# Patient Record
Sex: FEMALE | Race: WHITE | Employment: OTHER | URBAN - METROPOLITAN AREA
[De-identification: names, ages, dates, MRNs, and addresses within clinical notes are randomized per-mention and may not be internally consistent; named-entity substitution may affect disease eponyms.]

---

## 2019-01-03 ENCOUNTER — HOSPITAL ENCOUNTER (OUTPATIENT)
Age: 69
Setting detail: SPECIMEN
Discharge: HOME | End: 2019-01-03
Payer: COMMERCIAL

## 2019-01-04 LAB
DIRECT EXAM: NORMAL
Lab: NORMAL
SPECIMEN DESCRIPTION: NORMAL
STATUS: NORMAL

## 2020-01-13 ENCOUNTER — HOSPITAL ENCOUNTER (OUTPATIENT)
Age: 70
Setting detail: SPECIMEN
Discharge: HOME | End: 2020-01-13
Payer: MEDICARE

## 2020-01-15 LAB
CULTURE: ABNORMAL
Lab: ABNORMAL
SPECIMEN DESCRIPTION: ABNORMAL

## 2020-05-27 ENCOUNTER — HOSPITAL ENCOUNTER (OUTPATIENT)
Dept: GENERAL RADIOLOGY | Age: 70
Discharge: HOME | End: 2020-05-27
Payer: MEDICARE

## 2020-05-27 ENCOUNTER — HOSPITAL ENCOUNTER (OUTPATIENT)
Age: 70
Discharge: HOME | End: 2020-05-27
Payer: MEDICARE

## 2020-05-27 PROCEDURE — 71046 X-RAY EXAM CHEST 2 VIEWS: CPT

## 2020-08-05 ENCOUNTER — HOSPITAL ENCOUNTER (OUTPATIENT)
Age: 70
Setting detail: SPECIMEN
Discharge: HOME | End: 2020-08-05
Payer: MEDICARE

## 2020-08-07 LAB
CULTURE: ABNORMAL
Lab: ABNORMAL
SPECIMEN DESCRIPTION: ABNORMAL

## 2020-08-25 ENCOUNTER — HOSPITAL ENCOUNTER (OUTPATIENT)
Age: 70
Discharge: HOME | End: 2020-08-25
Payer: MEDICARE

## 2020-08-25 ENCOUNTER — HOSPITAL ENCOUNTER (OUTPATIENT)
Dept: GENERAL RADIOLOGY | Age: 70
Discharge: HOME | End: 2020-08-25
Payer: MEDICARE

## 2020-08-25 PROCEDURE — 72120 X-RAY BEND ONLY L-S SPINE: CPT

## 2020-08-25 PROCEDURE — 72072 X-RAY EXAM THORAC SPINE 3VWS: CPT

## 2020-08-26 ENCOUNTER — HOSPITAL ENCOUNTER (OUTPATIENT)
Dept: ULTRASOUND IMAGING | Age: 70
Discharge: HOME | End: 2020-08-26
Payer: MEDICARE

## 2020-11-04 ENCOUNTER — HOSPITAL ENCOUNTER (OUTPATIENT)
Dept: GENERAL RADIOLOGY | Age: 70
Discharge: HOME | End: 2020-11-04
Payer: MEDICARE

## 2020-11-04 ENCOUNTER — HOSPITAL ENCOUNTER (OUTPATIENT)
Age: 70
Discharge: HOME | End: 2020-11-04
Payer: MEDICARE

## 2020-11-04 PROCEDURE — 73502 X-RAY EXAM HIP UNI 2-3 VIEWS: CPT

## 2020-12-02 ENCOUNTER — HOSPITAL ENCOUNTER (OUTPATIENT)
Age: 70
Discharge: HOME | End: 2020-12-02
Payer: MEDICARE

## 2020-12-02 LAB
T4 FREE: 0.81 NG/DL (ref 0.93–1.76)
TSH SERPL DL<=0.05 MIU/L-ACNC: 3.65 UIU/ML (ref 0.4–4.2)

## 2020-12-02 PROCEDURE — 84443 ASSAY THYROID STIM HORMONE: CPT

## 2020-12-02 PROCEDURE — 84439 ASSAY OF FREE THYROXINE: CPT

## 2020-12-02 PROCEDURE — 36415 COLL VENOUS BLD VENIPUNCTURE: CPT

## 2020-12-04 ENCOUNTER — HOSPITAL ENCOUNTER (OUTPATIENT)
Age: 70
Setting detail: SPECIMEN
Discharge: HOME | End: 2020-12-04
Payer: MEDICARE

## 2020-12-04 PROCEDURE — U0003 INFECTIOUS AGENT DETECTION BY NUCLEIC ACID (DNA OR RNA); SEVERE ACUTE RESPIRATORY SYNDROME CORONAVIRUS 2 (SARS-COV-2) (CORONAVIRUS DISEASE [COVID-19]), AMPLIFIED PROBE TECHNIQUE, MAKING USE OF HIGH THROUGHPUT TECHNOLOGIES AS DESCRIBED BY CMS-2020-01-R: HCPCS

## 2020-12-06 LAB — SARS-COV-2: NOT DETECTED

## 2020-12-15 ENCOUNTER — APPOINTMENT (OUTPATIENT)
Dept: CT IMAGING | Age: 70
End: 2020-12-15
Payer: MEDICARE

## 2020-12-15 ENCOUNTER — HOSPITAL ENCOUNTER (EMERGENCY)
Age: 70
Discharge: HOME | End: 2020-12-15
Attending: EMERGENCY MEDICINE
Payer: MEDICARE

## 2020-12-15 VITALS
BODY MASS INDEX: 27.28 KG/M2 | OXYGEN SATURATION: 99 % | WEIGHT: 154 LBS | RESPIRATION RATE: 15 BRPM | DIASTOLIC BLOOD PRESSURE: 79 MMHG | SYSTOLIC BLOOD PRESSURE: 114 MMHG | TEMPERATURE: 98.2 F | HEART RATE: 85 BPM

## 2020-12-15 LAB
ALBUMIN SERPL-MCNC: 4.1 G/DL (ref 3.5–5.1)
ALP BLD-CCNC: 90 U/L (ref 38–126)
ALT SERPL-CCNC: 57 U/L (ref 11–66)
ANION GAP SERPL CALCULATED.3IONS-SCNC: 9 MEQ/L (ref 8–16)
AST SERPL-CCNC: 65 U/L (ref 5–40)
BASOPHILS # BLD: 0.4 %
BASOPHILS ABSOLUTE: 0 THOU/MM3 (ref 0–0.1)
BILIRUB SERPL-MCNC: 0.3 MG/DL (ref 0.3–1.2)
BUN BLDV-MCNC: 10 MG/DL (ref 7–22)
CALCIUM SERPL-MCNC: 9.4 MG/DL (ref 8.5–10.5)
CHLORIDE BLD-SCNC: 108 MEQ/L (ref 98–111)
CO2: 27 MEQ/L (ref 23–33)
CREAT SERPL-MCNC: 0.5 MG/DL (ref 0.4–1.2)
EOSINOPHIL # BLD: 1.1 %
EOSINOPHILS ABSOLUTE: 0.1 THOU/MM3 (ref 0–0.4)
ERYTHROCYTE [DISTWIDTH] IN BLOOD BY AUTOMATED COUNT: 13 % (ref 11.5–14.5)
ERYTHROCYTE [DISTWIDTH] IN BLOOD BY AUTOMATED COUNT: 45 FL (ref 35–45)
GFR SERPL CREATININE-BSD FRML MDRD: > 90 ML/MIN/1.73M2
GLUCOSE BLD-MCNC: 101 MG/DL (ref 70–108)
HCT VFR BLD CALC: 38.1 % (ref 37–47)
HEMOGLOBIN: 12.2 GM/DL (ref 12–16)
IMMATURE GRANS (ABS): 0.01 THOU/MM3 (ref 0–0.07)
IMMATURE GRANULOCYTES: 0.2 %
LIPASE: 16.6 U/L (ref 5.6–51.3)
LYMPHOCYTES # BLD: 29.4 %
LYMPHOCYTES ABSOLUTE: 1.6 THOU/MM3 (ref 1–4.8)
MCH RBC QN AUTO: 30.2 PG (ref 26–33)
MCHC RBC AUTO-ENTMCNC: 32 GM/DL (ref 32.2–35.5)
MCV RBC AUTO: 94.3 FL (ref 81–99)
MONOCYTES # BLD: 9.3 %
MONOCYTES ABSOLUTE: 0.5 THOU/MM3 (ref 0.4–1.3)
NUCLEATED RED BLOOD CELLS: 0 /100 WBC
OSMOLALITY CALCULATION: 286 MOSMOL/KG (ref 275–300)
PLATELET # BLD: 265 THOU/MM3 (ref 130–400)
PMV BLD AUTO: 10.6 FL (ref 9.4–12.4)
POTASSIUM REFLEX MAGNESIUM: 4.3 MEQ/L (ref 3.5–5.2)
RBC # BLD: 4.04 MILL/MM3 (ref 4.2–5.4)
SEG NEUTROPHILS: 59.6 %
SEGMENTED NEUTROPHILS ABSOLUTE COUNT: 3.3 THOU/MM3 (ref 1.8–7.7)
SODIUM BLD-SCNC: 144 MEQ/L (ref 135–145)
TOTAL PROTEIN: 7.2 G/DL (ref 6.1–8)
WBC # BLD: 5.5 THOU/MM3 (ref 4.8–10.8)

## 2020-12-15 PROCEDURE — 74177 CT ABD & PELVIS W/CONTRAST: CPT

## 2020-12-15 PROCEDURE — 83690 ASSAY OF LIPASE: CPT

## 2020-12-15 PROCEDURE — 80053 COMPREHEN METABOLIC PANEL: CPT

## 2020-12-15 PROCEDURE — 36415 COLL VENOUS BLD VENIPUNCTURE: CPT

## 2020-12-15 PROCEDURE — 74176 CT ABD & PELVIS W/O CONTRAST: CPT

## 2020-12-15 PROCEDURE — 85025 COMPLETE CBC W/AUTO DIFF WBC: CPT

## 2020-12-15 PROCEDURE — 99285 EMERGENCY DEPT VISIT HI MDM: CPT

## 2020-12-15 PROCEDURE — 6360000004 HC RX CONTRAST MEDICATION: Performed by: EMERGENCY MEDICINE

## 2020-12-15 RX ORDER — ONDANSETRON 4 MG/1
4 TABLET, ORALLY DISINTEGRATING ORAL EVERY 8 HOURS PRN
Qty: 20 TABLET | Refills: 0 | Status: SHIPPED | OUTPATIENT
Start: 2020-12-15

## 2020-12-15 RX ORDER — HYDROCODONE BITARTRATE AND ACETAMINOPHEN 5; 325 MG/1; MG/1
1 TABLET ORAL 2 TIMES DAILY PRN
Qty: 6 TABLET | Refills: 0 | Status: SHIPPED | OUTPATIENT
Start: 2020-12-15 | End: 2020-12-18

## 2020-12-15 RX ADMIN — IOPAMIDOL 80 ML: 755 INJECTION, SOLUTION INTRAVENOUS at 15:42

## 2020-12-15 ASSESSMENT — PAIN DESCRIPTION - ORIENTATION: ORIENTATION: LEFT;UPPER

## 2020-12-15 ASSESSMENT — PAIN SCALES - GENERAL: PAINLEVEL_OUTOF10: 3

## 2020-12-15 ASSESSMENT — PAIN DESCRIPTION - FREQUENCY: FREQUENCY: CONTINUOUS

## 2020-12-15 ASSESSMENT — PAIN DESCRIPTION - PAIN TYPE: TYPE: ACUTE PAIN

## 2020-12-15 ASSESSMENT — PAIN DESCRIPTION - LOCATION: LOCATION: ABDOMEN

## 2020-12-15 NOTE — ED NOTES
ED nurse-to-nurse bedside report    No chief complaint on file. LOC: alert and orientated to name, place, date  Vital signs   Vitals:    12/15/20 1143 12/15/20 1310   BP: 127/65 114/79   Pulse: 63 62   Resp: 15 15   Temp: 98.2 °F (36.8 °C)    TempSrc: Oral    SpO2: 99% 99%   Weight: 154 lb (69.9 kg)       Pain:    Pain Interventions: none  Pain Goal: none  Oxygen: No    Current needs required none   Telemetry: No  LDAs:    Continuous Infusions:   Mobility: Independent  Panchal Fall Risk Score: No flowsheet data found.   Fall Interventions: side rails  Report given to: REYNA Cheek RN  12/15/20 1310

## 2020-12-15 NOTE — ED NOTES
Pt resting in bed with side rails up 2x2 and call light in reach. Vital signs assessed and stable. Pt updated on plan of care. Pt voiced understanding. Pt verbalized no other needs or concerns at this time.         Uzma Soliz RN  12/15/20 0680

## 2020-12-15 NOTE — ED NOTES
Pt resting in bed with side rails up 2x2 and call light in reach. Vital signs assessed and stable. IV site checked. Pt updated on plan of care. Pt voiced understanding. Pt verbalized no other needs or concerns at this time.         Christy Ellison RN  12/15/20 1199

## 2020-12-15 NOTE — ED PROVIDER NOTES
[facility] EMERGENCY DEPT      CHIEF COMPLAINT       Chief Complaint   Patient presents with    Nausea    Flank Pain       Nurses Notes reviewed and I agree except as noted in the HPI. HISTORY OF PRESENT ILLNESS    Lilliam Mcdaniel is a 78 y.o. female who presents with complaint of left flank pain, patient is status post EGD and colonoscopy done by Dr. Juni Gutierrez 3 days ago. Patient states that she had worsening pain until yesterday which has gradually gotten better. She denies any fever chills, has been passing gas and having bowel movements. She has had no nausea or vomiting. Onset: Acute  Duration: Around 48 hours  Timing: Improving  Location of Pain: Left mid to upper quadrant region  Intesity/severity: Mild currently  Modifying Factors: Palpation  Relieved by;  Previous Episodes; Tx Before arrival: Ibuprofen  REVIEW OF SYSTEMS      Review of Systems   Constitutional: Negative for fever, chills, diaphoresis and fatigue. HENT: Negative for congestion, drooling, facial swelling and sore throat. Eyes: Negative for photophobia, pain and discharge. Respiratory: Negative for cough, shortness of breath, wheezing and stridor. Cardiovascular: Negative for chest pain, palpitations and leg swelling. Gastrointestinal: Negative for abdominal pain, blood in stool and abdominal distention. Positive for pain left mid to upper quadrant region  Genitourinary: Negative for dysuria, urgency, hematuria and difficulty urinating. Musculoskeletal: Negative for gait problem, neck pain and neck stiffness. Skin; No rash, No itching  Neurological: Negative for seizures, weakness and numbness. Psychiatric/Behavioral: Negative for hallucinations, confusion and agitation. PAST MEDICAL HISTORY    has a past medical history of Arrhythmia, Cancer ([address] Panic disorder, Restless leg syndrome, and Thyroid disease. SURGICAL HISTORY      has a past surgical history that includes Breast lumpectomy (1974);  Hysterectomy (); Cholecystectomy (); and fracture surgery (). CURRENT MEDICATIONS       Discharge Medication List as of 12/15/2020  6:15 PM      CONTINUE these medications which have NOT CHANGED    Details   omeprazole (PRILOSEC) 20 MG delayed release capsule Take 20 mg by mouth dailyHistorical Med      aspirin 81 MG EC tablet Take 81 mg by mouth dailyHistorical Med      ZINC PO Take 130 mg by mouth dailyHistorical Med      vitamin C (ASCORBIC ACID) 500 MG tablet Take 500 mg by mouth dailyHistorical Med      TURMERIC PO Take 180 mg by mouth dailyHistorical Med      VITAMIN E PO Take by mouth dailyHistorical Med      MAGNESIUM PO Take 250 mg by mouth 2 times daily Indications: 2 tabs bid Historical Med      metoprolol (LOPRESSOR) 25 MG tablet Take 0.5 tablets by mouth 2 times daily. , Disp-90 tablet,R-3Normal             ALLERGIES     is allergic to erythromycin and morphine. FAMILY HISTORY     She indicated that her mother is . She indicated that her father is . She indicated that her sister is alive. She indicated that both of her brothers are . She indicated that her maternal grandmother is . She indicated that her maternal grandfather is . She indicated that her paternal grandmother is . She indicated that her paternal grandfather is . She indicated that her son is alive. family history includes Cancer in her brother; Diabetes in her brother, mother, paternal grandfather, and sister; Heart Disease in her mother and son; Heart Failure in her father; Other in her brother and mother; Tuberculosis in her maternal grandmother. SOCIAL HISTORY      reports that she has never smoked. She has never used smokeless tobacco. She reports that she does not drink alcohol or use drugs. PHYSICAL EXAM     INITIAL VITALS:  weight is 154 lb (69.9 kg). Her oral temperature is 98.2 °F (36.8 °C). Her blood pressure is 114/79 and her pulse is 85.  Her respiration is 15 and oxygen saturation is 99%. Physical Exam   Constitutional:  well-developed and well-nourished. HENT: Head: Normocephalic, atraumatic, Bilateral external ears normal, Oropharynx mosit, No oral exudates, Nose normal.   Eyes: PERRL, EOMI, Conjunctiva normal, No discharge. No scleral icterus  Neck: Normal range of motion, No tenderness, Supple  Cardiovascular: Normal rate, regular rhythm, S1 normal and S2 normal.  Exam reveals no gallop. Pulmonary/Chest: Effort normal and breath sounds normal. No accessory muscle usage or stridor. No respiratory distress. no wheezes. has no rales. exhibits no tenderness. Abdominal: Soft. Bowel sounds are normal.  exhibits no distension. There is left mid to upper quadrant tenderness. There is no rebound and no guarding. Extremities: No edema, no tenderness, no cyanosis, no clubbing. Musculoskeletal: Good range of motion in major joints is observed. No major deformities noted. Neurological: Alert and oriented ×3, normal motor function, normal sensory function, no focal deficits. GCS 15  Skin: Skin is warm, dry and intact. No rash noted. No erythema. Psychiatric: Affect normal, judgment normal, mood normal.  DIFFERENTIAL DIAGNOSIS:   Colonic rupture, diverticulitis, splenic injury    DIAGNOSTIC RESULTS     EKG: All EKG's are interpreted by the Emergency Department Physician who either signs or Co-signs this chart in the absence of a cardiologist.      RADIOLOGY: non-plain film images(s) such as CT, Ultrasound and MRI are read by the radiologist.  Plain radiographic images are visualized and preliminarily interpreted by the emergency physician unless otherwise stated below.       LABS:   Labs Reviewed   CBC WITH AUTO DIFFERENTIAL - Abnormal; Notable for the following components:       Result Value    RBC 4.04 (*)     MCHC 32.0 (*)     All other components within normal limits   COMPREHENSIVE METABOLIC PANEL W/ REFLEX TO MG FOR LOW K - Abnormal; Notable for the following components:    AST 65 (*)     All other components within normal limits   LIPASE   ANION GAP   GLOMERULAR FILTRATION RATE, ESTIMATED   OSMOLALITY   URINE RT REFLEX TO CULTURE       EMERGENCY DEPARTMENT COURSE:   Vitals:    Vitals:    12/15/20 1310 12/15/20 1333 12/15/20 1508 12/15/20 1632   BP: 114/79      Pulse: 62 68 86 85   Resp: 15      Temp:       TempSrc:       SpO2: 99% 100% 98% 99%   Weight:         Patient presenting with complaint of left abdominal pain status post colonoscopy. CAT scan of the abdomen shows possible splenic laceration/hematoma formation. Patient is hemodynamically stable. Patient has no active bleeding per repeat CT abdomen with contrast.  She has had hematoma formation for over 3 days, case discussed with general surgery, Dr. Trace Davidson, thinks that patient can be discharged home with GI follow-up and repeat hemoglobin. Patient given return precautions, has been in room both admit understanding. CRITICAL CARE:       CONSULTS:  None    PROCEDURES:  None    FINAL IMPRESSION      1. Spleen hematoma, initial encounter          DISPOSITION/PLAN   Decision To Discharge    PATIENT REFERRED TO:  Sarah Rehman MD  [address] [place] [address] [address]  [phone]    Call in 1 day  RE-CHECK AND FURTHER TESTING AS NEEDED      DISCHARGE MEDICATIONS:  Discharge Medication List as of 12/15/2020  6:15 PM      START taking these medications    Details   HYDROcodone-acetaminophen (NORCO) 5-325 MG per tablet Take 1 tablet by mouth 2 times daily as needed for Pain for up to 3 days. , Disp-6 tablet, R-0Print      ondansetron (ZOFRAN ODT) 4 MG disintegrating tablet Take 1 tablet by mouth every 8 hours as needed for Nausea, Disp-20 tablet, R-0Print             (Please note that portions of this note were completed with a voice recognition program.  Efforts were made to edit the dictations but occasionally words are mis-transcribed.)    Dane Marrero, [address] [place] [address],   12/16/20 5608

## 2020-12-17 ENCOUNTER — HOSPITAL ENCOUNTER (OUTPATIENT)
Age: 70
Discharge: HOME | End: 2020-12-17
Payer: MEDICARE

## 2020-12-17 LAB
ERYTHROCYTE [DISTWIDTH] IN BLOOD BY AUTOMATED COUNT: 13.2 % (ref 11.5–14.5)
ERYTHROCYTE [DISTWIDTH] IN BLOOD BY AUTOMATED COUNT: 45.6 FL (ref 35–45)
HCT VFR BLD CALC: 38.2 % (ref 37–47)
HEMOGLOBIN: 12.4 GM/DL (ref 12–16)
MCH RBC QN AUTO: 30.8 PG (ref 26–33)
MCHC RBC AUTO-ENTMCNC: 32.5 GM/DL (ref 32.2–35.5)
MCV RBC AUTO: 94.8 FL (ref 81–99)
PLATELET # BLD: 291 THOU/MM3 (ref 130–400)
PMV BLD AUTO: 10.9 FL (ref 9.4–12.4)
RBC # BLD: 4.03 MILL/MM3 (ref 4.2–5.4)
WBC # BLD: 6.1 THOU/MM3 (ref 4.8–10.8)

## 2020-12-17 PROCEDURE — 36415 COLL VENOUS BLD VENIPUNCTURE: CPT

## 2020-12-17 PROCEDURE — 85027 COMPLETE CBC AUTOMATED: CPT

## 2021-01-07 ENCOUNTER — HOSPITAL ENCOUNTER (OUTPATIENT)
Age: 71
Discharge: HOME | End: 2021-01-07
Payer: MEDICARE

## 2021-01-07 LAB
T3 TOTAL: 120 NG/DL (ref 72–181)
TSH SERPL DL<=0.05 MIU/L-ACNC: 4.26 UIU/ML (ref 0.4–4.2)

## 2021-01-07 PROCEDURE — 36415 COLL VENOUS BLD VENIPUNCTURE: CPT

## 2021-01-07 PROCEDURE — 84480 ASSAY TRIIODOTHYRONINE (T3): CPT

## 2021-01-07 PROCEDURE — 84443 ASSAY THYROID STIM HORMONE: CPT

## 2021-01-07 PROCEDURE — 84442 ASSAY OF THYROID ACTIVITY: CPT

## 2021-01-07 PROCEDURE — 84436 ASSAY OF TOTAL THYROXINE: CPT

## 2021-01-08 LAB — THYROXINE (T4): 5.1 UG/DL (ref 4.5–10.9)

## 2021-01-10 LAB — THYROXINE BINDING GLOBULIN: 18.5 UG/ML (ref 13–30)

## 2021-02-08 ENCOUNTER — HOSPITAL ENCOUNTER (OUTPATIENT)
Dept: CT IMAGING | Age: 71
Discharge: HOME | End: 2021-02-08
Payer: MEDICARE

## 2021-02-08 DIAGNOSIS — S36.029D: ICD-10-CM

## 2021-02-08 PROCEDURE — 74150 CT ABDOMEN W/O CONTRAST: CPT

## 2021-04-08 ENCOUNTER — HOSPITAL ENCOUNTER (OUTPATIENT)
Age: 71
Discharge: HOME | End: 2021-04-08
Payer: MEDICARE

## 2021-04-08 LAB — SARS-COV-2 ANTIBODY, TOTAL: POSITIVE

## 2021-04-08 PROCEDURE — 86769 SARS-COV-2 COVID-19 ANTIBODY: CPT

## 2021-04-08 PROCEDURE — 36415 COLL VENOUS BLD VENIPUNCTURE: CPT

## 2021-04-12 ENCOUNTER — HOSPITAL ENCOUNTER (OUTPATIENT)
Age: 71
Discharge: HOME | End: 2021-04-12
Payer: MEDICARE

## 2021-04-12 LAB
ALBUMIN SERPL-MCNC: 4.3 G/DL (ref 3.5–5.1)
ALP BLD-CCNC: 91 U/L (ref 38–126)
ALT SERPL-CCNC: 17 U/L (ref 11–66)
ANION GAP SERPL CALCULATED.3IONS-SCNC: 10 MEQ/L (ref 8–16)
AST SERPL-CCNC: 23 U/L (ref 5–40)
BILIRUB SERPL-MCNC: 0.2 MG/DL (ref 0.3–1.2)
BUN BLDV-MCNC: 16 MG/DL (ref 7–22)
CALCIUM SERPL-MCNC: 8.9 MG/DL (ref 8.5–10.5)
CHLORIDE BLD-SCNC: 108 MEQ/L (ref 98–111)
CHOLESTEROL, TOTAL: 196 MG/DL (ref 100–199)
CO2: 25 MEQ/L (ref 23–33)
CREAT SERPL-MCNC: 0.6 MG/DL (ref 0.4–1.2)
ERYTHROCYTE [DISTWIDTH] IN BLOOD BY AUTOMATED COUNT: 13.4 % (ref 11.5–14.5)
ERYTHROCYTE [DISTWIDTH] IN BLOOD BY AUTOMATED COUNT: 46.1 FL (ref 35–45)
GFR SERPL CREATININE-BSD FRML MDRD: > 90 ML/MIN/1.73M2
GLUCOSE BLD-MCNC: 90 MG/DL (ref 70–108)
HCT VFR BLD CALC: 40.2 % (ref 37–47)
HDLC SERPL-MCNC: 51 MG/DL
HEMOGLOBIN: 12.5 GM/DL (ref 12–16)
LDL CHOLESTEROL CALCULATED: 129 MG/DL
MCH RBC QN AUTO: 29 PG (ref 26–33)
MCHC RBC AUTO-ENTMCNC: 31.1 GM/DL (ref 32.2–35.5)
MCV RBC AUTO: 93.3 FL (ref 81–99)
PLATELET # BLD: 294 THOU/MM3 (ref 130–400)
PMV BLD AUTO: 10.5 FL (ref 9.4–12.4)
POTASSIUM SERPL-SCNC: 4.2 MEQ/L (ref 3.5–5.2)
RBC # BLD: 4.31 MILL/MM3 (ref 4.2–5.4)
SODIUM BLD-SCNC: 143 MEQ/L (ref 135–145)
T4 FREE: 0.78 NG/DL (ref 0.93–1.76)
TOTAL PROTEIN: 7.2 G/DL (ref 6.1–8)
TRIGL SERPL-MCNC: 80 MG/DL (ref 0–199)
TSH SERPL DL<=0.05 MIU/L-ACNC: 5.87 UIU/ML (ref 0.4–4.2)
WBC # BLD: 5.6 THOU/MM3 (ref 4.8–10.8)

## 2021-04-12 PROCEDURE — 85027 COMPLETE CBC AUTOMATED: CPT

## 2021-04-12 PROCEDURE — 84443 ASSAY THYROID STIM HORMONE: CPT

## 2021-04-12 PROCEDURE — 84439 ASSAY OF FREE THYROXINE: CPT

## 2021-04-12 PROCEDURE — 80061 LIPID PANEL: CPT

## 2021-04-12 PROCEDURE — 80053 COMPREHEN METABOLIC PANEL: CPT

## 2021-04-12 PROCEDURE — 36415 COLL VENOUS BLD VENIPUNCTURE: CPT

## 2021-04-13 ENCOUNTER — OFFICE VISIT (OUTPATIENT)
Dept: SURGERY | Age: 71
End: 2021-04-13
Payer: MEDICARE

## 2021-04-13 VITALS
DIASTOLIC BLOOD PRESSURE: 62 MMHG | BODY MASS INDEX: 28.6 KG/M2 | WEIGHT: 161.4 LBS | HEART RATE: 87 BPM | RESPIRATION RATE: 20 BRPM | SYSTOLIC BLOOD PRESSURE: 138 MMHG | TEMPERATURE: 97.8 F | OXYGEN SATURATION: 95 % | HEIGHT: 63 IN

## 2021-04-13 DIAGNOSIS — S36.039D LACERATION OF SPLEEN, SUBSEQUENT ENCOUNTER: Primary | ICD-10-CM

## 2021-04-13 PROCEDURE — 99202 OFFICE O/P NEW SF 15 MIN: CPT | Performed by: SURGERY

## 2021-04-13 RX ORDER — DOCUSATE SODIUM 100 MG/1
100 CAPSULE, LIQUID FILLED ORAL 2 TIMES DAILY
COMMUNITY

## 2021-04-13 ASSESSMENT — ENCOUNTER SYMPTOMS
DIARRHEA: 0
CHEST TIGHTNESS: 0
ABDOMINAL PAIN: 1
SHORTNESS OF BREATH: 0
COUGH: 0
STRIDOR: 0
CONSTIPATION: 1
WHEEZING: 0
EYE PAIN: 0
ANAL BLEEDING: 0
RHINORRHEA: 0
PHOTOPHOBIA: 0
COLOR CHANGE: 0
APNEA: 0
EYE REDNESS: 0
EYE ITCHING: 0
ABDOMINAL DISTENTION: 1
SORE THROAT: 0
VOMITING: 0
BACK PAIN: 0
RECTAL PAIN: 0
BLOOD IN STOOL: 0
NAUSEA: 1
SINUS PRESSURE: 0
EYE DISCHARGE: 0
VOICE CHANGE: 0
CHOKING: 0
TROUBLE SWALLOWING: 0
FACIAL SWELLING: 0

## 2021-04-13 NOTE — PROGRESS NOTES
[place], [address] [place] [address]. [address]  [place] [address]  [phone]  New Patient Evaluation in Office    Pt Name: Koby Gomez  Date of Birth 1950   Today's Date: 4/13/2021  Medical Record Number: [id number]  Referring Provider: No ref. provider found  Primary Care Provider: RULA Larry - CNP  Chief Complaint   Patient presents with   [facility]     new patient--self ref for splenic pain since colonoscopy with Dr. Arvil Simmonds 12/2020-CT scan 2/8     ASSESSMENT       Diagnosis Orders   1. Laceration of spleen, subsequent encounter      -resolved  2. Musculoskeletal Pain   -most likely cause of pain due to reproducibility of sxs upon palpation and spleen injury resolution confirmed by imaging. Past Medical History:   Diagnosis Date    Arrhythmia     sees Dr Chidi [facility])     cervical; \"pre-cancer\" per pt    Contusion of spleen 2021    COVID-19 virus IgG antibody detected 04/08/2021    Panic disorder 2002    Restless leg syndrome 1970's    Thyroid disease     hypothyroid-does not take meds-Dr Ta Donohue in [address]     1. F/u with PCP as needed.   -Discussed exercises to improve shoulder movement and musculoskeletal sxs. Danilo Wade is a 73 y. o.female who presents with abdominal pain. The pain is described as aching and shooting at times, and is mild to moderate in intensity. Pain is located in the LUQ with radiation around to the back. Onset was 4 months ago. Symptoms have gradually improved since. Aggravating factors include physical activity and cleaning. Alleviating factors include CBD oil. Associated symptoms include nausea, that resolved 1 month ago. Pt was seen in the ED on 12/15/21 following a colonoscopy for LUQ pain and had a CT showing a \"small subcapsular laceration along the inferior margin\".  F/u imagining 8 wks later showing \"resolution of perisplenic blood and no residual splenic defect\". She denies weight changes, easy bruising/bleeding, changes in bowel habits, and vomiting. She denies history of hepatitis, inflammatory bowel disease, pancreatitis, jaundice, colitis and ulcer disease. Past Medical History  Past Medical History:   Diagnosis Date    Arrhythmia     sees Dr Laisha Reina [facility])     cervical; \"pre-cancer\" per pt    Contusion of spleen 2021    COVID-19 virus IgG antibody detected 04/08/2021    Panic disorder 2002    Restless leg syndrome 1970's    Thyroid disease     hypothyroid-does not take meds-Dr Bishop Prado in [facility]     Past Surgical History  Past Surgical History:   Procedure Laterality Date    BREAST LUMPECTOMY  1974    lt    CHOLECYSTECTOMY  2004   Martina Howe  2020    Dr. Jose Cantor EGD  2020    Dr. Clifford Harvey  2008    orif rt ring finger    HYSTERECTOMY  2002    with oopherectomy     Medications  Current Outpatient Medications   Medication Sig Dispense Refill    docusate sodium (COLACE) 100 MG capsule Take 100 mg by mouth 2 times daily      metoprolol succinate (TOPROL XL) 100 MG extended release tablet Take 100 mg by mouth 2 times daily      ondansetron (ZOFRAN ODT) 4 MG disintegrating tablet Take 1 tablet by mouth every 8 hours as needed for Nausea 20 tablet 0    omeprazole (PRILOSEC) 20 MG delayed release capsule Take 20 mg by mouth daily      aspirin 81 MG EC tablet Take 81 mg by mouth daily      vitamin C (ASCORBIC ACID) 500 MG tablet Take 500 mg by mouth daily      TURMERIC PO Take 180 mg by mouth daily      VITAMIN E PO Take by mouth daily      MAGNESIUM PO Take 250 mg by mouth 2 times daily Indications: 2 tabs bid       ZINC PO Take 130 mg by mouth daily       No current facility-administered medications for this visit. Allergies  is allergic to erythromycin and morphine. Family History  family history includes Cancer in her brother; Diabetes in her brother, mother, paternal grandfather, and sister;  Heart Disease appears stated age and Alert and oriented times 3, no acute distress and cooperative to examination with proper mood and affect. SKIN: Skin color, texture, turgor normal. No rashes or lesions. HEENT: Head is normocephalic, atraumatic. NECK: supple, symmetrical, trachea midline. CHEST/LUNGS: chest symmetric with normal A/P diameter, normal respiratory rate and rhythm, lungs clear to auscultation without wheezes, rales or rhonchi. CARDIOVASCULAR: Heart sounds are normal.  Regular rate and rhythm without murmur, gallop or rub. Normal S1 and S2.  ABDOMEN: Normal shape. Laparoscopic scar(s) present. Normal bowel sounds. No bruits. Soft, mild TTP of the LUQ, non-distended. RECTAL: deferred, not clinically indicated  NEUROLOGIC: There are no focalizing motor or sensory deficits. EXTREMITIES: no cyanosis, no clubbing and no edema. Thank you for the interesting evaluation. Further recommendations as listed above.        Electronically signed by Abhijeet Braswell DO on 4/13/2021 at 12:22 PM

## 2021-04-13 NOTE — PROGRESS NOTES
Subjective:      Patient ID: Jocy Kingston is a 73 y.o. female. Chief Complaint   Patient presents with   [facility]     new patient--self ref for splenic pain since colonoscopy with Dr. Ivy Blount 12/2020-CT scan 2/8       HPI    Review of Systems   Constitutional: Negative for activity change, appetite change, chills, diaphoresis, fatigue, fever and unexpected weight change. HENT: Negative for congestion, dental problem, drooling, ear discharge, ear pain, facial swelling, hearing loss, mouth sores, nosebleeds, postnasal drip, rhinorrhea, sinus pressure, sneezing, sore throat, tinnitus, trouble swallowing and voice change. Eyes: Negative for photophobia, pain, discharge, redness, itching and visual disturbance. Respiratory: Negative for apnea, cough, choking, chest tightness, shortness of breath, wheezing and stridor. Cardiovascular: Negative for chest pain, palpitations and leg swelling. Gastrointestinal: Positive for abdominal distention, abdominal pain, constipation and nausea. Negative for anal bleeding, blood in stool, diarrhea, rectal pain and vomiting. Endocrine: Negative for cold intolerance, heat intolerance, polydipsia, polyphagia and polyuria. Genitourinary: Negative for decreased urine volume, difficulty urinating, dysuria, enuresis, flank pain, frequency, genital sores, hematuria and urgency. Musculoskeletal: Negative for arthralgias, back pain, gait problem, joint swelling, myalgias, neck pain and neck stiffness. Skin: Negative for color change, pallor, rash and wound. Allergic/Immunologic: Negative for environmental allergies, food allergies and immunocompromised state. Neurological: Negative for dizziness, tremors, seizures, syncope, facial asymmetry, speech difficulty, weakness, light-headedness, numbness and headaches. Hematological: Negative for adenopathy. Does not bruise/bleed easily.    Psychiatric/Behavioral: Negative for agitation, behavioral problems, confusion, decreased concentration, dysphoric mood, hallucinations, self-injury, sleep disturbance and suicidal ideas. The patient is not nervous/anxious and is not hyperactive.       /62 (Site: Right Upper Arm, Position: Sitting, Cuff Size: Medium Adult)   Pulse 87   Temp 97.8 °F (36.6 °C) (Temporal)   Resp 20   Ht 5' 3\" (1.6 m)   Wt 161 lb 6.4 oz (73.2 kg)   SpO2 95%   BMI 28.59 kg/m²     Objective:   Physical Exam    Assessment:            Plan:              Daria Donaldson LPN

## 2021-04-13 NOTE — LETTER
[place]  [address] [facility]. [address]  [place] [address] [phone]     Pt Name: Sabine Pretty  Medical Record Number: [id number]  Date of Birth 1950   Today's Date: 4/13/2021    [address] Kate Magallanes was evaluated in the office today. My assessment and plans are listed below. ASSESSMENT      Diagnosis Orders   1. Laceration of spleen, subsequent encounter      -resolved  2. Musculoskeletal Pain   -most likely cause of pain due to reproducibility of sxs upon palpation and spleen injury resolution confirmed by imaging. Past Medical History:   Diagnosis Date    Arrhythmia     sees Dr Colunga [facility])     cervical; \"pre-cancer\" per pt    Contusion of spleen 2021    COVID-19 virus IgG antibody detected 04/08/2021    Panic disorder 2002    Restless leg syndrome 1970's    Thyroid disease     hypothyroid-does not take meds-Dr Anny Buchanan in [facility] [address]:   1. F/u with PCP as needed.   -Discussed exercises to improve shoulder movement and musculoskeletal sxs. If I can provide any additional assistance or you have any concerns, please feel free to contact me. Thank you for allowing to participate in the care of your patients. Sincerely,      Gilbert Hooks.  Zackery Joseph

## 2021-05-19 ENCOUNTER — HOSPITAL ENCOUNTER (OUTPATIENT)
Age: 71
Discharge: HOME | End: 2021-05-19
Payer: MEDICARE

## 2021-05-19 LAB
T4 FREE: 1.97 NG/DL (ref 0.93–1.76)
TSH SERPL DL<=0.05 MIU/L-ACNC: 0.06 UIU/ML (ref 0.4–4.2)

## 2021-05-19 PROCEDURE — 36415 COLL VENOUS BLD VENIPUNCTURE: CPT

## 2021-05-19 PROCEDURE — 84439 ASSAY OF FREE THYROXINE: CPT

## 2021-05-19 PROCEDURE — 84443 ASSAY THYROID STIM HORMONE: CPT

## 2021-06-21 ENCOUNTER — HOSPITAL ENCOUNTER (OUTPATIENT)
Age: 71
Discharge: HOME | End: 2021-06-21
Payer: MEDICARE

## 2021-06-21 LAB
T4 FREE: 1.66 NG/DL (ref 0.93–1.76)
TSH SERPL DL<=0.05 MIU/L-ACNC: 0.03 UIU/ML (ref 0.4–4.2)

## 2021-06-21 PROCEDURE — 84439 ASSAY OF FREE THYROXINE: CPT

## 2021-06-21 PROCEDURE — 84443 ASSAY THYROID STIM HORMONE: CPT

## 2021-06-21 PROCEDURE — 36415 COLL VENOUS BLD VENIPUNCTURE: CPT

## 2024-01-16 ENCOUNTER — OFFICE VISIT (OUTPATIENT)
Dept: UROLOGY | Age: 74
End: 2024-01-16
Payer: MEDICARE

## 2024-01-16 VITALS — RESPIRATION RATE: 14 BRPM | BODY MASS INDEX: 27.83 KG/M2 | WEIGHT: 163 LBS | HEIGHT: 64 IN

## 2024-01-16 DIAGNOSIS — R30.0 DYSURIA: Primary | ICD-10-CM

## 2024-01-16 PROCEDURE — 1123F ACP DISCUSS/DSCN MKR DOCD: CPT | Performed by: NURSE PRACTITIONER

## 2024-01-16 PROCEDURE — 99203 OFFICE O/P NEW LOW 30 MIN: CPT | Performed by: NURSE PRACTITIONER

## 2024-01-16 RX ORDER — CEPHALEXIN 500 MG/1
CAPSULE ORAL
COMMUNITY
Start: 2024-01-11

## 2024-01-16 RX ORDER — MELOXICAM 15 MG/1
TABLET ORAL
COMMUNITY
Start: 2022-07-06

## 2024-01-16 RX ORDER — LEVOTHYROXINE SODIUM 112 UG/1
112 TABLET ORAL DAILY
COMMUNITY
Start: 2023-10-30

## 2024-01-16 NOTE — PATIENT INSTRUCTIONS
Increase fluid intake to 64 oz per day.   Start D mannose 500 mg three times daily.   Use barrier ointment (A&D ointment) to periarea to help with burning.     Foods and drinks that can be irritating to the bladder and contribute to pain:

## 2024-01-16 NOTE — PROGRESS NOTES
[facility] [place] [facility]  [facility]  [address]  [address]  [facility] [address]  Dept: [phone]  Loc: [phone]    Visit Date: 1/16/2024        HPI:     Andressa Pat is a 73 y.o. female who presents today for:  Chief Complaint   Patient presents with    New Patient       HPI  Pt referred to our office for recurrent UTIs.      Reports a history of recurrent UTIs spanning the last 5-6 years.  Reports she gets a UTI usually about every 3 months.  Symptoms including chilling and aching through her hips, itching, burning, odor to urine, and cloudy appearance of urine.      She reports a worsening in symptoms if she drinks sweet tea, dark pop, caffeine.      Last UTI was ~ 1 week ago treated by PCP with Keflex for 5 days.      Drinks Kombucha daily.     Hx hysterectomy at age 52    Current Outpatient Medications   Medication Sig Dispense Refill    cephALEXin (KEFLEX) 500 MG capsule TAKE 1 CAPSULE BY MOUTH 4 TIMES A DAY FOR 5 DAYS      dilTIAZem (CARDIZEM) 30 MG tablet Take 1 tablet by mouth daily as needed      levothyroxine (SYNTHROID) 112 MCG tablet Take 1 tablet by mouth daily      meloxicam (MOBIC) 15 MG tablet Take by mouth      docusate sodium (COLACE) 100 MG capsule Take 1 capsule by mouth 2 times daily      metoprolol succinate (TOPROL XL) 100 MG extended release tablet Take 1 tablet by mouth 2 times daily      ondansetron (ZOFRAN ODT) 4 MG disintegrating tablet Take 1 tablet by mouth every 8 hours as needed for Nausea 20 tablet 0    omeprazole (PRILOSEC) 20 MG delayed release capsule Take 1 capsule by mouth daily      aspirin 81 MG EC tablet Take 1 tablet by mouth daily      vitamin C (ASCORBIC ACID) 500 MG tablet Take 1 tablet by mouth daily      MAGNESIUM PO Take 250 mg by mouth 2 times daily Indications: 2 tabs bid       ZINC PO Take 130 mg by mouth daily (Patient not taking: Reported on 1/16/2024)      TURMERIC PO Take 180 mg by mouth daily (Patient not

## 2024-01-17 ASSESSMENT — ENCOUNTER SYMPTOMS
BACK PAIN: 0
VOMITING: 0
NAUSEA: 0
ABDOMINAL PAIN: 0

## 2024-03-29 ENCOUNTER — HOSPITAL ENCOUNTER (EMERGENCY)
Age: 74
Discharge: HOME | End: 2024-03-29
Payer: MEDICARE

## 2024-03-29 ENCOUNTER — APPOINTMENT (OUTPATIENT)
Dept: GENERAL RADIOLOGY | Age: 74
End: 2024-03-29
Payer: MEDICARE

## 2024-03-29 VITALS
HEART RATE: 91 BPM | SYSTOLIC BLOOD PRESSURE: 121 MMHG | RESPIRATION RATE: 16 BRPM | BODY MASS INDEX: 27.31 KG/M2 | TEMPERATURE: 97.4 F | HEIGHT: 64 IN | WEIGHT: 160 LBS | DIASTOLIC BLOOD PRESSURE: 79 MMHG | OXYGEN SATURATION: 97 %

## 2024-03-29 DIAGNOSIS — S93.401A SPRAIN OF RIGHT ANKLE, UNSPECIFIED LIGAMENT, INITIAL ENCOUNTER: Primary | ICD-10-CM

## 2024-03-29 PROCEDURE — 73610 X-RAY EXAM OF ANKLE: CPT

## 2024-03-29 PROCEDURE — 99212 OFFICE O/P EST SF 10 MIN: CPT

## 2024-03-29 PROCEDURE — 99202 OFFICE O/P NEW SF 15 MIN: CPT

## 2024-03-29 PROCEDURE — 99213 OFFICE O/P EST LOW 20 MIN: CPT

## 2024-03-29 ASSESSMENT — PAIN DESCRIPTION - ORIENTATION: ORIENTATION: RIGHT

## 2024-03-29 ASSESSMENT — PAIN DESCRIPTION - PAIN TYPE: TYPE: ACUTE PAIN

## 2024-03-29 ASSESSMENT — PAIN DESCRIPTION - DESCRIPTORS: DESCRIPTORS: ACHING

## 2024-03-29 ASSESSMENT — PAIN - FUNCTIONAL ASSESSMENT: PAIN_FUNCTIONAL_ASSESSMENT: 0-10

## 2024-03-29 ASSESSMENT — PAIN SCALES - GENERAL: PAINLEVEL_OUTOF10: 7

## 2024-03-29 NOTE — ED PROVIDER NOTES
[facility] URGENT CARE  Urgent Care Encounter       CHIEF COMPLAINT       Chief Complaint   Patient presents with    Ankle Pain     Right ankle pain    Ankle Injury     Twisted right ankle on cat toy and slipped down one step 3/28/24 2200       Nurses Notes reviewed and I agree except as noted in the HPI.  HISTORY OF PRESENT ILLNESS   Andressa Pat is a 74 y.o. female who presents with complaints of right ankle pan and injury that occurred last night. Pt reports that she tripped over a cat toy and proceeded to slip down one of her steps with her ankle twisted. Pt reports pain on right lateral ankle. Pt reports pain 7/10 at this time. Reports using ice, cbd lotion, and OTC pain medication. Pt reports that since she has been walking on it pain has gotten better.     The history is provided by the patient.       REVIEW OF SYSTEMS     Review of Systems   Musculoskeletal:  Positive for arthralgias.        (R) ankle pain and injury   All other systems reviewed and are negative.      PAST MEDICAL HISTORY         Diagnosis Date    Arrhythmia     sees Dr Diaz    Cancer ([facility])     cervical; \"pre-cancer\" per pt    Contusion of spleen 2021    COVID-19 virus IgG antibody detected 04/08/2021    Panic disorder 2002    Restless leg syndrome 1970's    Thyroid disease     hypothyroid-does not take meds-Dr Kapadia in [place]       SURGICALHISTORY     Patient  has a past surgical history that includes Breast lumpectomy (1974); Hysterectomy (2002); Cholecystectomy (2004); fracture surgery (2008); EGD (2020); and Colonoscopy (2020).    CURRENT MEDICATIONS       Discharge Medication List as of 3/29/2024  8:56 AM        CONTINUE these medications which have NOT CHANGED    Details   dilTIAZem (CARDIZEM) 30 MG tablet Take 1 tablet by mouth daily as neededHistorical Med      levothyroxine (SYNTHROID) 112 MCG tablet Take 1 tablet by mouth dailyHistorical Med      meloxicam (MOBIC) 15 MG tablet Take by mouthHistorical Med

## 2024-03-29 NOTE — ED TRIAGE NOTES
Pt C/O right lateral ankle pain. Twisted right ankle on cat toy and slipped down one step 3/28/24 2200. Slight edema noted right lateral ankle. Pt did ice and take OTC last night and applied a CBD oil to ankle.

## 2024-03-29 NOTE — DISCHARGE INSTRUCTIONS
Rest  Ice  Elevation  Tylenol  Ankle brace or ace wrap  Walking as tolerated  Follow up with PCP or OIO if needed if symptoms persist x1 week and not getting better

## 2024-04-01 ENCOUNTER — TELEPHONE (OUTPATIENT)
Dept: UROLOGY | Age: 74
End: 2024-04-01

## 2024-10-14 ENCOUNTER — HOSPITAL ENCOUNTER (OUTPATIENT)
Age: 74
Discharge: HOME | End: 2024-10-14
Payer: MEDICARE

## 2024-10-14 ENCOUNTER — HOSPITAL ENCOUNTER (OUTPATIENT)
Dept: GENERAL RADIOLOGY | Age: 74
Discharge: HOME | End: 2024-10-14
Payer: MEDICARE

## 2024-10-14 DIAGNOSIS — R07.82 INTERCOSTAL PAIN: ICD-10-CM

## 2024-10-14 PROCEDURE — 71046 X-RAY EXAM CHEST 2 VIEWS: CPT

## 2024-10-14 PROCEDURE — 71111 X-RAY EXAM RIBS/CHEST4/> VWS: CPT

## 2024-10-25 ENCOUNTER — HOSPITAL ENCOUNTER (OUTPATIENT)
Dept: WOMENS IMAGING | Age: 74
Discharge: HOME | End: 2024-10-25
Payer: MEDICARE

## 2024-10-25 DIAGNOSIS — N95.8 OTHER SPECIFIED MENOPAUSAL AND PERIMENOPAUSAL DISORDERS: ICD-10-CM

## 2024-10-25 PROCEDURE — 77080 DXA BONE DENSITY AXIAL: CPT

## 2024-12-20 ENCOUNTER — HOSPITAL ENCOUNTER (OUTPATIENT)
Dept: PHYSICAL THERAPY | Age: 74
Setting detail: THERAPIES SERIES
Discharge: HOME | End: 2024-12-20
Payer: MEDICARE

## 2024-12-20 PROCEDURE — 97161 PT EVAL LOW COMPLEX 20 MIN: CPT

## 2024-12-20 PROCEDURE — 97110 THERAPEUTIC EXERCISES: CPT

## 2024-12-20 NOTE — PROGRESS NOTES
* PLEASE SIGN, DATE AND TIME CERTIFICATION BELOW AND RETURN TO [facility] OUTPATIENT REHABILITATION (FAX #: [phone]).  ATTEST/CO-SIGN IF ACCESSING VIA INBASKET.  THANK YOU.**    I certify that I have examined the patient below and determined that Physical Medicine and Rehabilitation service is necessary and that I approve the established plan of care for up to 90 days or as specifically noted.  Attestation, signature or co-signature of physician indicates approval of certification requirements.    ________________________ ____________  Physician Signature   Date   [facility]  PHYSICAL THERAPY  [x] EVALUATION  [] DAILY NOTE (LAND) [] DAILY NOTE (AQUATIC ) [] PROGRESS NOTE [] DISCHARGE NOTE    [x] OUTPATIENT REHABILITATION [facility]   [] [facility]    [] [facility]   [] [facility]    Date: 2024  Patient Name:  Andressa Pat  : 1950  MRN: [id number]  CSN: [id number]    Referring Practitioner Jj Sadler MD [id number]      Diagnosis    Treatment Diagnosis Z47.89 Encounter for orthopedic aftercare  M25.561  Right Knee Pain  M25.661 Stiffness of right knee, not elsewhere classified  R53.1 Weakness  R26.2 Difficulty in walking   Date of Evaluation 24   Additional Pertinent History Andressa Pat has a past medical history of Arrhythmia, Cancer (HCC), Contusion of spleen, COVID-19 virus IgG antibody detected, Panic disorder, Restless leg syndrome, and Thyroid disease.  she has a past surgical history that includes Breast lumpectomy (); Hysterectomy (); Cholecystectomy (); fracture surgery (); EGD (); and Colonoscopy ().     Allergies Allergies   Allergen Reactions    Erythromycin Hives    Morphine Other (See Comments)     Nausea and vomiting      Medications   Current Outpatient Medications:     dilTIAZem (CARDIZEM) 30 MG tablet, Take 1 tablet by mouth daily as needed, Disp: , Rfl:     levothyroxine

## 2024-12-23 ENCOUNTER — HOSPITAL ENCOUNTER (OUTPATIENT)
Dept: PHYSICAL THERAPY | Age: 74
Setting detail: THERAPIES SERIES
Discharge: HOME | End: 2024-12-23
Payer: MEDICARE

## 2024-12-23 PROCEDURE — 97110 THERAPEUTIC EXERCISES: CPT

## 2024-12-23 NOTE — PROGRESS NOTES
Education completed  [x]  Reviewed Prior HEP      [x]  Patient verbalized and/or demonstrated understanding of education provided.  []  Patient unable to verbalize and/or demonstrate understanding of education provided.  Will continue education.  []  Barriers to learning:     PLAN:  Treatment Recommendations: Strengthening, Range of Motion, Balance Training, Gait Training, Neuromuscular Re-education, Manual Therapy - Soft Tissue Mobilization, Pain Management, Home Exercise Program, Patient Education, and Modalities    []  Plan of care initiated.  Plan to see patient 2 times per week for 4 weeks to address the treatment planned outlined above.  [x]  Continue with current plan of care  []  Modify plan of care as follows:    []  Hold pending physician visit  []  Discharge    Time In 1523   Time Out 1603   Timed Code Minutes: 40 min   Total Treatment Time: 40 min       Electronically Signed by: Billie Gregory PT

## 2024-12-27 ENCOUNTER — APPOINTMENT (OUTPATIENT)
Dept: PHYSICAL THERAPY | Age: 74
End: 2024-12-27
Payer: MEDICARE

## 2024-12-30 ENCOUNTER — HOSPITAL ENCOUNTER (OUTPATIENT)
Dept: PHYSICAL THERAPY | Age: 74
Setting detail: THERAPIES SERIES
Discharge: HOME | End: 2024-12-30
Payer: MEDICARE

## 2024-12-30 PROCEDURE — 97110 THERAPEUTIC EXERCISES: CPT

## 2024-12-30 NOTE — DISCHARGE SUMMARY
Disp: 20 tablet, Rfl: 0    omeprazole (PRILOSEC) 20 MG delayed release capsule, Take 1 capsule by mouth daily, Disp: , Rfl:     aspirin 81 MG EC tablet, Take 1 tablet by mouth daily, Disp: , Rfl:     vitamin C (ASCORBIC ACID) 500 MG tablet, Take 1 tablet by mouth daily, Disp: , Rfl:     MAGNESIUM PO, Take 250 mg by mouth 2 times daily Indications: 2 tabs bid , Disp: , Rfl:       Functional Outcome Measure Used LEFS   Functional Outcome Score 29/80 (12/20/24) , 54/80 (12/30/24)       Insurance: Primary: Payor: [facility] MEDICARE /  /  / ,   Secondary:    Authorization Information PRE CERTIFICATION REQUIRED: Precert required after initial evaluation.  INSURANCE THERAPY BENEFIT: Visits approved based on medical necessity..    AQUATIC THERAPY COVERED: Yes  MODALITIES COVERED:  Yes   Initial CPT Codes Requested 48918 - Therapeutic Exercise, 04448 - Manual Therapy , 32404 - Neuromuscular Re-Education , and 34197 - Gait Training   Progress Note Counter 3/10 for progress note (Reporting Period: 12/20/24 to  12/30/24   )   Recertification Date 01/17/25   Physician Follow-Up 1/2/25   Physician Orders    History of Present Illness Pt underwent right knee menisectomy on 12/18/24, discharged home the same day. Pt notes that pain has been manageable with tramadol and icing. Pt notes using standard walker some, also walks w/o AD. Pt denies calf pain, denies fever like symptoms. Pt denies any falls/injury to her knee since surgery. Pt notes that she has not been completing any HEP since surgery.      SUBJECTIVE: Pt notes that she and her  was ill last week, was unable to make it to her appointment. Pt notes feeling \"not quite 100%\", still having some anterior/medial knee pain. Pt notes that she does still notice \"catch\" sensation at times within her knee with walking. Pt notes compliance c HEP, going well.     OBJECTIVE:            LOWER EXTREMITY RANGE OF MOTION     Left Right Comments   Hip Flexion         Hip Extension

## 2025-07-29 ENCOUNTER — APPOINTMENT (OUTPATIENT)
Dept: GENERAL RADIOLOGY | Age: 75
End: 2025-07-29
Payer: MEDICARE

## 2025-07-29 ENCOUNTER — HOSPITAL ENCOUNTER (EMERGENCY)
Age: 75
Discharge: HOME | End: 2025-07-29
Payer: MEDICARE

## 2025-07-29 VITALS
OXYGEN SATURATION: 97 % | RESPIRATION RATE: 16 BRPM | WEIGHT: 147 LBS | SYSTOLIC BLOOD PRESSURE: 128 MMHG | HEART RATE: 77 BPM | BODY MASS INDEX: 25.1 KG/M2 | HEIGHT: 64 IN | DIASTOLIC BLOOD PRESSURE: 70 MMHG | TEMPERATURE: 97.1 F

## 2025-07-29 DIAGNOSIS — S63.501A RIGHT WRIST SPRAIN, INITIAL ENCOUNTER: Primary | ICD-10-CM

## 2025-07-29 PROCEDURE — 73110 X-RAY EXAM OF WRIST: CPT

## 2025-07-29 PROCEDURE — 99213 OFFICE O/P EST LOW 20 MIN: CPT

## 2025-07-29 ASSESSMENT — PAIN DESCRIPTION - FREQUENCY: FREQUENCY: INTERMITTENT

## 2025-07-29 ASSESSMENT — PAIN DESCRIPTION - PAIN TYPE: TYPE: ACUTE PAIN

## 2025-07-29 ASSESSMENT — PAIN DESCRIPTION - ONSET: ONSET: ON-GOING

## 2025-07-29 ASSESSMENT — PAIN SCALES - GENERAL: PAINLEVEL_OUTOF10: 5

## 2025-07-29 ASSESSMENT — PAIN DESCRIPTION - ORIENTATION: ORIENTATION: RIGHT

## 2025-07-29 ASSESSMENT — PAIN DESCRIPTION - LOCATION: LOCATION: WRIST

## 2025-07-29 ASSESSMENT — PAIN - FUNCTIONAL ASSESSMENT
PAIN_FUNCTIONAL_ASSESSMENT: ACTIVITIES ARE NOT PREVENTED
PAIN_FUNCTIONAL_ASSESSMENT: 0-10

## 2025-07-29 NOTE — ED PROVIDER NOTES
[facility] URGENT CARE  Urgent Care Encounter      CHIEF COMPLAINT       Chief Complaint   Patient presents with    Wrist Injury     Right- Fell backward 1.5 week ago- Continues to have pain  Hx of Osteoporosis        Nurses Notes reviewed and I agree except as noted in the HPI.  HISTORY OF PRESENT ILLNESS   Andressa Pat is a 75 y.o. female who presents to urgent care with complaints of right wrist injury.  Patient reports approximately 1 week ago she was working in her flower bed when she fell backwards and hurt her right wrist.  Patient reports she is unsure how the mechanism of the fall occurred.  Patient reports she believes she had her hand outstretched although was not sure.  Patient reports she does have history of osteoporosis.  Reports she has been taking Tylenol for symptoms.    REVIEW OF SYSTEMS     Review of Systems   Constitutional:  Negative for fever.   Musculoskeletal:  Positive for myalgias. Negative for joint swelling.   Neurological:  Negative for weakness and numbness.       PAST MEDICAL HISTORY         Diagnosis Date    Arrhythmia     sees Dr Diaz    Cancer (HCC)     cervical; \"pre-cancer\" per pt    Contusion of spleen 2021    COVID-19 virus IgG antibody detected 04/08/2021    Panic disorder 2002    Restless leg syndrome 1970's    Thyroid disease     hypothyroid-does not take meds-Dr Kapadia in [place]       SURGICAL HISTORY     Patient  has a past surgical history that includes Breast lumpectomy (1974); Hysterectomy (2002); Cholecystectomy (2004); fracture surgery (2008); EGD (2020); and Colonoscopy (2020).    CURRENT MEDICATIONS       Discharge Medication List as of 7/29/2025  2:21 PM        CONTINUE these medications which have NOT CHANGED    Details   dilTIAZem (CARDIZEM) 30 MG tablet Take 1 tablet by mouth daily as neededHistorical Med      levothyroxine (SYNTHROID) 112 MCG tablet Take 1 tablet by mouth dailyHistorical Med      meloxicam (MOBIC) 15 MG tablet Take by

## 2025-07-29 NOTE — ED TRIAGE NOTES
Arrives to [facility] for the evaluation of right wrist injury that occurred approx 1.5 week ago after having a fall.  Was in raised garden bed and fell backward.  States that the wrist did not swell or bruise.  Continues to have pain, more so with movement.  Has Hx of osteoporosis and concerned for possible fracture.  Right radial pulse palpable.  Has been elevating, icing and wearing an elastic ACE wrap.  At this time pain is rated 5/10 in severity.  Plan for xray of the right wrist.